# Patient Record
Sex: MALE | Race: WHITE | Employment: UNEMPLOYED | URBAN - METROPOLITAN AREA
[De-identification: names, ages, dates, MRNs, and addresses within clinical notes are randomized per-mention and may not be internally consistent; named-entity substitution may affect disease eponyms.]

---

## 2021-06-16 ENCOUNTER — APPOINTMENT (OUTPATIENT)
Dept: GENERAL RADIOLOGY | Age: 61
End: 2021-06-16
Attending: PHYSICIAN ASSISTANT

## 2021-06-16 ENCOUNTER — HOSPITAL ENCOUNTER (EMERGENCY)
Age: 61
Discharge: HOME | End: 2021-06-16
Attending: EMERGENCY MEDICINE

## 2021-06-16 VITALS
TEMPERATURE: 99.1 F | WEIGHT: 225 LBS | SYSTOLIC BLOOD PRESSURE: 163 MMHG | RESPIRATION RATE: 16 BRPM | OXYGEN SATURATION: 100 % | DIASTOLIC BLOOD PRESSURE: 89 MMHG | HEIGHT: 75 IN | HEART RATE: 84 BPM | BODY MASS INDEX: 27.98 KG/M2

## 2021-06-16 DIAGNOSIS — K52.9 GASTROENTERITIS, ACUTE: Primary | ICD-10-CM

## 2021-06-16 DIAGNOSIS — R05.9 COUGH: ICD-10-CM

## 2021-06-16 LAB
ALBUMIN SERPL-MCNC: 3.9 G/DL (ref 3.4–5)
ALBUMIN/GLOB SERPL: 1 {RATIO} (ref 0.8–1.7)
ALP SERPL-CCNC: 104 U/L (ref 45–117)
ALT SERPL-CCNC: 27 U/L (ref 16–61)
ANION GAP SERPL CALC-SCNC: 9 MMOL/L (ref 3–18)
APPEARANCE UR: CLEAR
AST SERPL-CCNC: 21 U/L (ref 10–38)
ATRIAL RATE: 90 BPM
BACTERIA URNS QL MICRO: NEGATIVE /HPF
BASOPHILS # BLD: 0 K/UL (ref 0–0.1)
BASOPHILS NFR BLD: 1 % (ref 0–2)
BILIRUB SERPL-MCNC: 0.9 MG/DL (ref 0.2–1)
BILIRUB UR QL: NEGATIVE
BNP SERPL-MCNC: 468 PG/ML (ref 0–900)
BUN SERPL-MCNC: 15 MG/DL (ref 7–18)
BUN/CREAT SERPL: 20 (ref 12–20)
CALCIUM SERPL-MCNC: 8.9 MG/DL (ref 8.5–10.1)
CALCULATED P AXIS, ECG09: 57 DEGREES
CALCULATED R AXIS, ECG10: -42 DEGREES
CALCULATED T AXIS, ECG11: 37 DEGREES
CHLORIDE SERPL-SCNC: 99 MMOL/L (ref 100–111)
CO2 SERPL-SCNC: 27 MMOL/L (ref 21–32)
COLOR UR: YELLOW
CREAT SERPL-MCNC: 0.76 MG/DL (ref 0.6–1.3)
DIAGNOSIS, 93000: NORMAL
DIFFERENTIAL METHOD BLD: ABNORMAL
EOSINOPHIL # BLD: 0 K/UL (ref 0–0.4)
EOSINOPHIL NFR BLD: 0 % (ref 0–5)
EPITH CASTS URNS QL MICRO: ABNORMAL /LPF (ref 0–5)
ERYTHROCYTE [DISTWIDTH] IN BLOOD BY AUTOMATED COUNT: 19.2 % (ref 11.6–14.5)
GLOBULIN SER CALC-MCNC: 4 G/DL (ref 2–4)
GLUCOSE SERPL-MCNC: 115 MG/DL (ref 74–99)
GLUCOSE UR STRIP.AUTO-MCNC: NEGATIVE MG/DL
HCT VFR BLD AUTO: 27.6 % (ref 36–48)
HGB BLD-MCNC: 8.1 G/DL (ref 13–16)
HGB UR QL STRIP: NEGATIVE
KETONES UR QL STRIP.AUTO: 80 MG/DL
LEUKOCYTE ESTERASE UR QL STRIP.AUTO: NEGATIVE
LIPASE SERPL-CCNC: 68 U/L (ref 73–393)
LYMPHOCYTES # BLD: 0.3 K/UL (ref 0.9–3.6)
LYMPHOCYTES NFR BLD: 7 % (ref 21–52)
MCH RBC QN AUTO: 20.9 PG (ref 24–34)
MCHC RBC AUTO-ENTMCNC: 29.3 G/DL (ref 31–37)
MCV RBC AUTO: 71.3 FL (ref 74–97)
MONOCYTES # BLD: 0.4 K/UL (ref 0.05–1.2)
MONOCYTES NFR BLD: 9 % (ref 3–10)
MUCOUS THREADS URNS QL MICRO: ABNORMAL /LPF
NEUTS SEG # BLD: 3.6 K/UL (ref 1.8–8)
NEUTS SEG NFR BLD: 83 % (ref 40–73)
NITRITE UR QL STRIP.AUTO: NEGATIVE
P-R INTERVAL, ECG05: 160 MS
PH UR STRIP: 7 [PH] (ref 5–8)
PLATELET # BLD AUTO: 250 K/UL (ref 135–420)
PMV BLD AUTO: 10.2 FL (ref 9.2–11.8)
POTASSIUM SERPL-SCNC: 3.9 MMOL/L (ref 3.5–5.5)
PROT SERPL-MCNC: 7.9 G/DL (ref 6.4–8.2)
PROT UR STRIP-MCNC: 30 MG/DL
Q-T INTERVAL, ECG07: 360 MS
QRS DURATION, ECG06: 94 MS
QTC CALCULATION (BEZET), ECG08: 440 MS
RBC # BLD AUTO: 3.87 M/UL (ref 4.35–5.65)
RBC #/AREA URNS HPF: NEGATIVE /HPF (ref 0–5)
SODIUM SERPL-SCNC: 135 MMOL/L (ref 136–145)
SP GR UR REFRACTOMETRY: 1.02 (ref 1–1.03)
TROPONIN I SERPL-MCNC: <0.02 NG/ML (ref 0–0.04)
UROBILINOGEN UR QL STRIP.AUTO: 1 EU/DL (ref 0.2–1)
VENTRICULAR RATE, ECG03: 90 BPM
WBC # BLD AUTO: 4.4 K/UL (ref 4.6–13.2)
WBC URNS QL MICRO: ABNORMAL /HPF (ref 0–4)

## 2021-06-16 PROCEDURE — 96374 THER/PROPH/DIAG INJ IV PUSH: CPT

## 2021-06-16 PROCEDURE — 81001 URINALYSIS AUTO W/SCOPE: CPT

## 2021-06-16 PROCEDURE — 96361 HYDRATE IV INFUSION ADD-ON: CPT

## 2021-06-16 PROCEDURE — 84484 ASSAY OF TROPONIN QUANT: CPT

## 2021-06-16 PROCEDURE — 93005 ELECTROCARDIOGRAM TRACING: CPT

## 2021-06-16 PROCEDURE — 99284 EMERGENCY DEPT VISIT MOD MDM: CPT

## 2021-06-16 PROCEDURE — 71046 X-RAY EXAM CHEST 2 VIEWS: CPT

## 2021-06-16 PROCEDURE — 80053 COMPREHEN METABOLIC PANEL: CPT

## 2021-06-16 PROCEDURE — 83880 ASSAY OF NATRIURETIC PEPTIDE: CPT

## 2021-06-16 PROCEDURE — 74011250636 HC RX REV CODE- 250/636: Performed by: PHYSICIAN ASSISTANT

## 2021-06-16 PROCEDURE — 85025 COMPLETE CBC W/AUTO DIFF WBC: CPT

## 2021-06-16 PROCEDURE — 83690 ASSAY OF LIPASE: CPT

## 2021-06-16 RX ORDER — SIMVASTATIN 80 MG/1
80 TABLET, FILM COATED ORAL
COMMUNITY

## 2021-06-16 RX ORDER — TRAZODONE HYDROCHLORIDE 50 MG/1
TABLET ORAL
COMMUNITY

## 2021-06-16 RX ORDER — LISINOPRIL 10 MG/1
10 TABLET ORAL DAILY
COMMUNITY

## 2021-06-16 RX ORDER — MELATONIN
5000 DAILY
COMMUNITY

## 2021-06-16 RX ORDER — OMEPRAZOLE 20 MG/1
20 CAPSULE, DELAYED RELEASE ORAL DAILY
COMMUNITY

## 2021-06-16 RX ORDER — BUPROPION HYDROCHLORIDE 150 MG/1
150 TABLET ORAL DAILY
COMMUNITY

## 2021-06-16 RX ORDER — METOPROLOL TARTRATE 50 MG/1
50 TABLET ORAL 2 TIMES DAILY
COMMUNITY

## 2021-06-16 RX ORDER — ONDANSETRON 4 MG/1
TABLET, ORALLY DISINTEGRATING ORAL
Qty: 10 TABLET | Refills: 0 | Status: SHIPPED | OUTPATIENT
Start: 2021-06-16

## 2021-06-16 RX ORDER — ONDANSETRON 2 MG/ML
4 INJECTION INTRAMUSCULAR; INTRAVENOUS
Status: COMPLETED | OUTPATIENT
Start: 2021-06-16 | End: 2021-06-16

## 2021-06-16 RX ORDER — DOCUSATE SODIUM 100 MG/1
200 CAPSULE, LIQUID FILLED ORAL DAILY
COMMUNITY

## 2021-06-16 RX ORDER — HYDROXYZINE 50 MG/1
50 TABLET, FILM COATED ORAL
COMMUNITY

## 2021-06-16 RX ORDER — LANOLIN ALCOHOL/MO/W.PET/CERES
12 CREAM (GRAM) TOPICAL
COMMUNITY

## 2021-06-16 RX ADMIN — SODIUM CHLORIDE 1000 ML: 900 INJECTION, SOLUTION INTRAVENOUS at 12:06

## 2021-06-16 RX ADMIN — ONDANSETRON 4 MG: 2 INJECTION INTRAMUSCULAR; INTRAVENOUS at 11:19

## 2021-06-16 RX ADMIN — SODIUM CHLORIDE 1000 ML: 900 INJECTION, SOLUTION INTRAVENOUS at 11:18

## 2021-06-16 NOTE — ED PROVIDER NOTES
EMERGENCY DEPARTMENT HISTORY AND PHYSICAL EXAM    11:12 AM      Date: 6/16/2021  Patient Name: Esequiel Ziegler    History of Presenting Illness     Chief Complaint   Patient presents with    Vomiting    Diarrhea    Headache    Dehydration       History Provided By: Patient    Chief Complaint: Nausea, vomiting, diarrhea, dehydration, headache, cough, congestion, shortness of breath  Duration: 3 Days  Timing:  Acute  Location:   Quality: Aching  Severity: Moderate  Modifying Factors:   Associated Symptoms: denies any other associated signs or symptoms      Additional History (Context):Robbie Bella is a 57 y.o. male with a pertinent history of ACS status post cardiac stent, hypertension, depression, hyperlipidemia, and GERD who presents to the emergency department for evaluation of nausea, vomiting, and diarrhea for the past 3 days. Patient states he is feeling very dehydrated. He believes that he caught a stomach bug. He also admits to a headache, cough, and chest congestion as well as some shortness of breath. Patient reports no history of heart failure, but admits to bilateral lower extremity edema. Patient denies any significant edema at this time. He reports he is vaccinated against Covid. No recent urinary symptoms. Patient denies history of diabetes. No associated chest pain, fevers, sore throat, or abdominal pain. Patient denies eating any undercooked food. No known ill contacts. PCP:  None        Current Facility-Administered Medications   Medication Dose Route Frequency Provider Last Rate Last Admin    sodium chloride 0.9 % bolus infusion 1,000 mL  1,000 mL IntraVENous ONCE Darnell Samuel PA-C         Current Outpatient Medications   Medication Sig Dispense Refill    buPROPion XL (WELLBUTRIN XL) 150 mg tablet Take 150 mg by mouth daily.  lisinopriL (PRINIVIL, ZESTRIL) 10 mg tablet Take 10 mg by mouth daily.  omeprazole (PRILOSEC) 20 mg capsule Take 20 mg by mouth daily.       cholecalciferol (Vitamin D3) (1000 Units /25 mcg) tablet Take 5,000 Units by mouth daily.  simvastatin (ZOCOR) 80 mg tablet Take 80 mg by mouth nightly.  metoprolol tartrate (LOPRESSOR) 50 mg tablet Take 50 mg by mouth two (2) times a day.  melatonin 3 mg tablet Take 12 mg by mouth nightly as needed for Insomnia.  hydrOXYzine HCL (ATARAX) 50 mg tablet Take 50 mg by mouth nightly as needed.  traZODone (DESYREL) 50 mg tablet Take  by mouth nightly as needed for Sleep.  docusate sodium (COLACE) 100 mg capsule Take 200 mg by mouth daily.  ondansetron (Zofran ODT) 4 mg disintegrating tablet Take 1-2 tablets every 6-8 hours as needed for nausea and vomiting. 10 Tablet 0       Past History     Past Medical History:  Past Medical History:   Diagnosis Date    Depression     GERD (gastroesophageal reflux disease)     High cholesterol     Hypertension     Insomnia     Vitamin D deficiency        Past Surgical History:  Past Surgical History:   Procedure Laterality Date    HX CORONARY STENT PLACEMENT      HX GASTRIC BYPASS         Family History:  History reviewed. No pertinent family history. Social History:  Social History     Tobacco Use    Smoking status: Never Smoker    Smokeless tobacco: Never Used   Substance Use Topics    Alcohol use: Never    Drug use: Never       Allergies:  No Known Allergies      Review of Systems       Review of Systems   Constitutional: Negative for chills and fever. HENT: Negative for congestion, rhinorrhea and sore throat. Respiratory: Positive for cough and shortness of breath. Cardiovascular: Negative for chest pain. Gastrointestinal: Positive for diarrhea, nausea and vomiting. Negative for abdominal pain, blood in stool and constipation. Genitourinary: Negative for dysuria, frequency and hematuria. Musculoskeletal: Negative for back pain and myalgias. Skin: Negative for rash and wound. Neurological: Positive for headaches. Negative for dizziness. All other systems reviewed and are negative. Physical Exam     Visit Vitals  BP (!) 163/89 (BP 1 Location: Left upper arm, BP Patient Position: At rest)   Pulse 84   Temp 99.1 °F (37.3 °C)   Resp 16   Ht 6' 3\" (1.905 m)   Wt 102.1 kg (225 lb)   SpO2 100%   BMI 28.12 kg/m²       Physical Exam  Vitals and nursing note reviewed. Constitutional:       General: He is not in acute distress. Appearance: He is well-developed. He is not diaphoretic. Comments: Well-appearing, nontoxic   HENT:      Head: Normocephalic and atraumatic. Nose: Rhinorrhea present. No congestion. Comments: Bilateral clear rhinorrhea noted     Mouth/Throat:      Pharynx: No oropharyngeal exudate or posterior oropharyngeal erythema. Eyes:      Conjunctiva/sclera: Conjunctivae normal.   Cardiovascular:      Rate and Rhythm: Normal rate and regular rhythm. Heart sounds: Normal heart sounds. Pulmonary:      Effort: Pulmonary effort is normal. No respiratory distress. Breath sounds: Normal breath sounds. No stridor. No wheezing, rhonchi or rales. Comments: Lungs are clear to auscultation bilaterally  Chest:      Chest wall: No tenderness. Abdominal:      General: Bowel sounds are normal. There is no distension. Palpations: Abdomen is soft. Tenderness: There is no abdominal tenderness. There is no guarding or rebound. Comments: Abdomen is soft, nontender, nondistended   Musculoskeletal:         General: No swelling, tenderness, deformity or signs of injury. Normal range of motion. Cervical back: Normal range of motion and neck supple. Right lower leg: No edema. Left lower leg: No edema. Skin:     General: Skin is warm and dry. Neurological:      General: No focal deficit present. Mental Status: He is alert and oriented to person, place, and time. Cranial Nerves: No cranial nerve deficit. Sensory: No sensory deficit.       Motor: No weakness. Coordination: Coordination normal.         Diagnostic Study Results     Labs -  Recent Results (from the past 12 hour(s))   CBC WITH AUTOMATED DIFF    Collection Time: 06/16/21 11:15 AM   Result Value Ref Range    WBC 4.4 (L) 4.6 - 13.2 K/uL    RBC 3.87 (L) 4.35 - 5.65 M/uL    HGB 8.1 (L) 13.0 - 16.0 g/dL    HCT 27.6 (L) 36.0 - 48.0 %    MCV 71.3 (L) 74.0 - 97.0 FL    MCH 20.9 (L) 24.0 - 34.0 PG    MCHC 29.3 (L) 31.0 - 37.0 g/dL    RDW 19.2 (H) 11.6 - 14.5 %    PLATELET 029 326 - 749 K/uL    MPV 10.2 9.2 - 11.8 FL    NEUTROPHILS 83 (H) 40 - 73 %    LYMPHOCYTES 7 (L) 21 - 52 %    MONOCYTES 9 3 - 10 %    EOSINOPHILS 0 0 - 5 %    BASOPHILS 1 0 - 2 %    ABS. NEUTROPHILS 3.6 1.8 - 8.0 K/UL    ABS. LYMPHOCYTES 0.3 (L) 0.9 - 3.6 K/UL    ABS. MONOCYTES 0.4 0.05 - 1.2 K/UL    ABS. EOSINOPHILS 0.0 0.0 - 0.4 K/UL    ABS. BASOPHILS 0.0 0.0 - 0.1 K/UL    DF AUTOMATED     METABOLIC PANEL, COMPREHENSIVE    Collection Time: 06/16/21 11:15 AM   Result Value Ref Range    Sodium 135 (L) 136 - 145 mmol/L    Potassium 3.9 3.5 - 5.5 mmol/L    Chloride 99 (L) 100 - 111 mmol/L    CO2 27 21 - 32 mmol/L    Anion gap 9 3.0 - 18 mmol/L    Glucose 115 (H) 74 - 99 mg/dL    BUN 15 7.0 - 18 MG/DL    Creatinine 0.76 0.6 - 1.3 MG/DL    BUN/Creatinine ratio 20 12 - 20      GFR est AA >60 >60 ml/min/1.73m2    GFR est non-AA >60 >60 ml/min/1.73m2    Calcium 8.9 8.5 - 10.1 MG/DL    Bilirubin, total 0.9 0.2 - 1.0 MG/DL    ALT (SGPT) 27 16 - 61 U/L    AST (SGOT) 21 10 - 38 U/L    Alk.  phosphatase 104 45 - 117 U/L    Protein, total 7.9 6.4 - 8.2 g/dL    Albumin 3.9 3.4 - 5.0 g/dL    Globulin 4.0 2.0 - 4.0 g/dL    A-G Ratio 1.0 0.8 - 1.7     LIPASE    Collection Time: 06/16/21 11:15 AM   Result Value Ref Range    Lipase 68 (L) 73 - 393 U/L   URINALYSIS W/ RFLX MICROSCOPIC    Collection Time: 06/16/21 11:15 AM   Result Value Ref Range    Color YELLOW      Appearance CLEAR      Specific gravity 1.022 1.005 - 1.030      pH (UA) 7.0 5.0 - 8.0 Protein 30 (A) NEG mg/dL    Glucose Negative NEG mg/dL    Ketone 80 (A) NEG mg/dL    Bilirubin Negative NEG      Blood Negative NEG      Urobilinogen 1.0 0.2 - 1.0 EU/dL    Nitrites Negative NEG      Leukocyte Esterase Negative NEG     TROPONIN I    Collection Time: 06/16/21 11:15 AM   Result Value Ref Range    Troponin-I, QT <0.02 0.0 - 0.045 NG/ML   NT-PRO BNP    Collection Time: 06/16/21 11:15 AM   Result Value Ref Range    NT pro- 0 - 900 PG/ML   URINE MICROSCOPIC ONLY    Collection Time: 06/16/21 11:15 AM   Result Value Ref Range    WBC 0 to 3 0 - 4 /hpf    RBC Negative 0 - 5 /hpf    Epithelial cells FEW 0 - 5 /lpf    Bacteria Negative NEG /hpf    Mucus FEW (A) NEG /lpf   EKG, 12 LEAD, INITIAL    Collection Time: 06/16/21 11:20 AM   Result Value Ref Range    Ventricular Rate 90 BPM    Atrial Rate 90 BPM    P-R Interval 160 ms    QRS Duration 94 ms    Q-T Interval 360 ms    QTC Calculation (Bezet) 440 ms    Calculated P Axis 57 degrees    Calculated R Axis -42 degrees    Calculated T Axis 37 degrees    Diagnosis       Normal sinus rhythm  Left axis deviation  Abnormal ECG  No previous ECGs available         Radiologic Studies -   XR CHEST PA LAT    Result Date: 6/16/2021  PA and lateral CXR: HISTORY: Congestion. Cardiac silhouette and vascularity within normal limits. Lungs are slightly hyperinflated with bronchial thickening. No consolidation or pleural effusion. Suspect mild bronchitis with no pneumonia or CHF. Medical Decision Making   I am the first provider for this patient. I reviewed the vital signs, available nursing notes, past medical history, past surgical history, family history and social history. Vital Signs-Reviewed the patient's vital signs. Pulse Oximetry Analysis -  100% on room air (Interpretation)    EKG: Interpreted by the EP.    Time Interpreted:    Rate:    Rhythm: Normal Sinus Rhythm with left axis deviation   Interpretation:   Comparison:     Records Reviewed: Nursing Notes and Old Medical Records (Time of Review: 11:12 AM)    ED Course: Progress Notes, Reevaluation, and Consults:    Provider Notes (Medical Decision Making):   differential diagnosis: Gastroenteritis, dehydration, electrolyte abnormality, pneumonia    Plan: Patient presents ambulatory in no significant distress with elevated blood pressure and otherwise normal vitals. Examination reveals benign abdomen. HEENT exam consistent with acute, uncomplicated viral etiology. Lungs are clear to auscultation. EKG reveals normal sinus rhythm without acute ischemic changes. Troponin and proBNP are within normal limits. CBC with slight shift, but no overall leukocytosis. Mild hyponatremia and hypochloremia. Exam and work-up consistent with mild viral illness. Chest x-ray without pneumonia or CHF, but does reveal acute bronchitis. Do not feel that any further work-up is warranted at this time. Patient was treated here with 2 L IV bolus and Zofran. He is feeling much better. Advised on supportive care, including prescription for Zofran and following a bland diet over the next few days. Patient provided with a work note. At this time, patient is stable and appropriate for discharge home. Patient demonstrates understanding of current diagnoses and is in agreement with the treatment plan. They are advised that while the likelihood of serious underlying condition is low at this point given the evaluation performed today, we cannot fully rule it out. They are advised to immediately return with any new symptoms or worsening of current condition. All questions have been answered. Patient is given educational material regarding their diagnoses, including danger symptoms and when to return to the ED. Diagnosis     Clinical Impression:   1. Gastroenteritis, acute    2.  Cough        Disposition: DC Home    Follow-up Information     Follow up With Specialties Details Why Contact Info    [facility] EMERGENCY DEPT Emergency Medicine Go to  As needed [address]  [phone]    Your Primary care provider  Call  For follow-up            Patient's Medications   Start Taking    ONDANSETRON (ZOFRAN ODT) 4 MG DISINTEGRATING TABLET    Take 1-2 tablets every 6-8 hours as needed for nausea and vomiting. Continue Taking    BUPROPION XL (WELLBUTRIN XL) 150 MG TABLET    Take 150 mg by mouth daily. CHOLECALCIFEROL (VITAMIN D3) (1000 UNITS /25 MCG) TABLET    Take 5,000 Units by mouth daily. DOCUSATE SODIUM (COLACE) 100 MG CAPSULE    Take 200 mg by mouth daily. HYDROXYZINE HCL (ATARAX) 50 MG TABLET    Take 50 mg by mouth nightly as needed. LISINOPRIL (PRINIVIL, ZESTRIL) 10 MG TABLET    Take 10 mg by mouth daily. MELATONIN 3 MG TABLET    Take 12 mg by mouth nightly as needed for Insomnia. METOPROLOL TARTRATE (LOPRESSOR) 50 MG TABLET    Take 50 mg by mouth two (2) times a day. OMEPRAZOLE (PRILOSEC) 20 MG CAPSULE    Take 20 mg by mouth daily. SIMVASTATIN (ZOCOR) 80 MG TABLET    Take 80 mg by mouth nightly. TRAZODONE (DESYREL) 50 MG TABLET    Take  by mouth nightly as needed for Sleep. These Medications have changed    No medications on file   Stop Taking    No medications on file     _______________________________    This note was dictated utilizing voice recognition software which may lead to typographical errors. I apologize in advance if the situation occurs. If questions arise please do not hesitate to contact me or call our department.   Ifeoma Ulloa PA-C

## 2021-06-16 NOTE — ED TRIAGE NOTES
Patient c/o vomiting, diarrhea and headache x 3 days. Denies any known positive covid contacts.   States subjective fever

## 2023-08-11 NOTE — Clinical Note
[facility] EMERGENCY DEPT 
[address] [id number] [phone] Work/School Note Date: 6/16/2021 To Whom It May concern: 
 
Joi Taylor was seen and treated today in the emergency room by the following provider(s): 
Attending Provider: Ingrid Silveira MD 
Physician Assistant: Jose C Mccormick PA-C. Joi Taylor is excused from work/school on 06/16/21 and 06/17/21. He is medically clear to return to work/school on 6/18/2021. Sincerely, George Amos PA-C  
 
 

9/2022